# Patient Record
Sex: FEMALE | Race: ASIAN | NOT HISPANIC OR LATINO
[De-identification: names, ages, dates, MRNs, and addresses within clinical notes are randomized per-mention and may not be internally consistent; named-entity substitution may affect disease eponyms.]

---

## 2023-12-18 ENCOUNTER — APPOINTMENT (OUTPATIENT)
Dept: ORTHOPEDIC SURGERY | Facility: CLINIC | Age: 38
End: 2023-12-18
Payer: COMMERCIAL

## 2023-12-18 VITALS — WEIGHT: 160 LBS | BODY MASS INDEX: 27.31 KG/M2 | HEIGHT: 64 IN

## 2023-12-18 DIAGNOSIS — M23.91 UNSPECIFIED INTERNAL DERANGEMENT OF RIGHT KNEE: ICD-10-CM

## 2023-12-18 DIAGNOSIS — M25.461 EFFUSION, RIGHT KNEE: ICD-10-CM

## 2023-12-18 DIAGNOSIS — M12.261 VILLONODULAR SYNOVITIS (PIGMENTED), RIGHT KNEE: ICD-10-CM

## 2023-12-18 DIAGNOSIS — M94.261 CHONDROMALACIA, RIGHT KNEE: ICD-10-CM

## 2023-12-18 PROBLEM — Z00.00 ENCOUNTER FOR PREVENTIVE HEALTH EXAMINATION: Status: ACTIVE | Noted: 2023-12-18

## 2023-12-18 PROCEDURE — 73564 X-RAY EXAM KNEE 4 OR MORE: CPT | Mod: RT

## 2023-12-18 PROCEDURE — 20610 DRAIN/INJ JOINT/BURSA W/O US: CPT | Mod: RT

## 2023-12-18 PROCEDURE — 99204 OFFICE O/P NEW MOD 45 MIN: CPT | Mod: 25

## 2023-12-18 RX ORDER — METHYLPREDNISOLONE 4 MG/1
4 TABLET ORAL
Qty: 1 | Refills: 0 | Status: ACTIVE | COMMUNITY
Start: 2023-12-18 | End: 1900-01-01

## 2023-12-18 NOTE — PROCEDURE
[FreeTextEntry3] : Aspiration Procedure Note:  The risks, benefits, and alternatives to aspiration were reviewed with the patient.  Risks outlined include but are not limited to infection, sepsis, bleeding, temporary increase in pain, syncopal episode, failure to resolve symptoms, and symptoms recurrence. Patient understood the risks and asked to proceed with this treatment course.  Patient Identification Name/: Verbal with patient and/or family  Procedure Verification: Procedure confirmed with patient or family/designee Consent for procedure: Verbal Consent Given Relevant documentation completed, reviewed, and signed Clinical indications for procedure confirmed  Time-out with all members of procedure team immediately prior to procedure: Correct patient identified. Agreement on procedure. Correct side and site.  KNEE ASPIRATION - RIGHT After verbal consent and identification of the correct patient and correct site, the superolateral right knee was prepped using alcohol swabs and betadine. This was allowed time to air dry.  75 cc of bloody fluid was aspirated from the knee using a sterile 18G needle after ethyl chloride spray for skin anesthesia. The patient tolerated the procedure well. After-care instructions were provided and included instructions to ice the area and to call if redness, pain, or fever develop.

## 2023-12-18 NOTE — IMAGING
[de-identified] :  RIGHT KNEE Inspection:  mod effusion Palpation: medial facet of the patella tenderness  Knee Range of Motion:  0-135 Strength: 5/5 Quadriceps strength, 5/5 Hamstring strength, 4/5 Hip Abductor strength Neurological: light touch is intact throughout Ligament Stability and Special Tests:  McMurrays: neg Lachman: neg Pivot Shift: neg Posterior Drawer: neg Valgus: neg Varus: neg Patella Apprehension: neg Patella Maltracking: neg

## 2023-12-18 NOTE — HISTORY OF PRESENT ILLNESS
[de-identified] : 38 year old female  (  )  chronic right knee pain worsening since 7/2023, The pain is located  anterior, The pain is associated with  swelling, catching, sense of locking Worse with increased activity and better at rest. Has tried MDP, Naproxen MRI from [facility] R knee for review

## 2023-12-18 NOTE — ASSESSMENT
[FreeTextEntry1] : mri right knee 12/12/23 - lareg eff, synov, 2.2cm focus behind fat pad poss PVNS  Right X-Ray Examination of the KNEE (4 views): there are no fractures, subluxations or dislocations.  - We discussed their diagnosis and treatment options at length including the risks and benefits of both surgical and non-surgical options. - We will continue conservative treatment with icing, anti-inflammatory medication, and PT  - The patient was provided with a prescription to work on hip ER/abductors strengthening along with quad/hamstring stretches and strengthening  - The patient was advised to let pain guide the gradual advancement of activities.  - MDP - Discussed possible side effects of medication along with timing and frequency for taking  - R knee asp, ya well  - discussed possibly following up with ortho oncologist to discuss radiation/other treatment - Follow up in 6 weeks to re-evaluate if not better in future can consider synov

## 2024-01-31 ENCOUNTER — APPOINTMENT (OUTPATIENT)
Dept: ORTHOPEDIC SURGERY | Facility: CLINIC | Age: 39
End: 2024-01-31